# Patient Record
Sex: FEMALE | Race: OTHER | HISPANIC OR LATINO | URBAN - METROPOLITAN AREA
[De-identification: names, ages, dates, MRNs, and addresses within clinical notes are randomized per-mention and may not be internally consistent; named-entity substitution may affect disease eponyms.]

---

## 2018-10-14 ENCOUNTER — EMERGENCY (EMERGENCY)
Facility: HOSPITAL | Age: 28
LOS: 0 days | Discharge: HOME | End: 2018-10-14
Admitting: PHYSICIAN ASSISTANT

## 2018-10-14 VITALS
RESPIRATION RATE: 18 BRPM | DIASTOLIC BLOOD PRESSURE: 67 MMHG | OXYGEN SATURATION: 98 % | HEART RATE: 90 BPM | TEMPERATURE: 101 F | SYSTOLIC BLOOD PRESSURE: 127 MMHG

## 2018-10-14 DIAGNOSIS — R59.0 LOCALIZED ENLARGED LYMPH NODES: ICD-10-CM

## 2018-10-14 DIAGNOSIS — J02.9 ACUTE PHARYNGITIS, UNSPECIFIED: ICD-10-CM

## 2018-10-14 DIAGNOSIS — F17.200 NICOTINE DEPENDENCE, UNSPECIFIED, UNCOMPLICATED: ICD-10-CM

## 2018-10-14 DIAGNOSIS — Z79.2 LONG TERM (CURRENT) USE OF ANTIBIOTICS: ICD-10-CM

## 2018-10-14 RX ORDER — AMOXICILLIN 250 MG/5ML
1 SUSPENSION, RECONSTITUTED, ORAL (ML) ORAL
Qty: 20 | Refills: 0 | OUTPATIENT
Start: 2018-10-14 | End: 2018-10-23

## 2018-10-14 RX ORDER — ACETAMINOPHEN 500 MG
650 TABLET ORAL ONCE
Qty: 0 | Refills: 0 | Status: COMPLETED | OUTPATIENT
Start: 2018-10-14 | End: 2018-10-14

## 2018-10-14 RX ADMIN — Medication 650 MILLIGRAM(S): at 19:31

## 2018-10-14 NOTE — ED PROVIDER NOTE - PHYSICAL EXAMINATION
VITAL SIGNS: I have reviewed nursing notes and confirm.  CONSTITUTIONAL: Well-developed; well-nourished; in no acute distress.  SKIN: Skin exam is warm and dry, no acute rash.  HEAD: Normocephalic; atraumatic.  EYES: Conjunctiva and sclera clear.  ENT: No nasal discharge; airway clear. TMs clear. (+) tonsillar exudates/erythema. Uvula midline.   NECK: Supple; non tender. (+) anterior cervical LAD.   CARD: S1, S2 normal; no murmurs, gallops, or rubs. Regular rate and rhythm.  RESP: No wheezes, rales or rhonchi. Speaking in full sentences.   EXT: Normal ROM.   NEURO: Alert, oriented. Grossly unremarkable. No focal deficits.

## 2018-10-14 NOTE — ED PROVIDER NOTE - MEDICAL DECISION MAKING DETAILS
26 yo F presents to ED for sore throat x 2 days. Centor 4 points. Will prescribe amoxicillin and give ENT follow-up.

## 2022-01-11 NOTE — ED PROVIDER NOTE - OBJECTIVE STATEMENT
26 yo F with no significant PMHx presents to the ED c/o sore throat x 2 days. Pt states she gets strep infections almost every year during change of season. Pt admits to associated chills. Pt denies other complaints. Pt denies fever, chills, nausea, vomiting, abdominal pain, diarrhea, headache, dizziness, weakness, chest pain, SOB, back pain, urinary symptoms, cough. 67

## 2023-03-28 PROBLEM — Z00.00 ENCOUNTER FOR PREVENTIVE HEALTH EXAMINATION: Status: ACTIVE | Noted: 2023-03-28

## 2023-10-14 ENCOUNTER — EMERGENCY (EMERGENCY)
Facility: HOSPITAL | Age: 33
LOS: 0 days | Discharge: HOME | End: 2023-10-14
Attending: EMERGENCY MEDICINE
Payer: MEDICAID

## 2023-10-14 VITALS
TEMPERATURE: 98 F | WEIGHT: 179.9 LBS | HEART RATE: 82 BPM | SYSTOLIC BLOOD PRESSURE: 119 MMHG | DIASTOLIC BLOOD PRESSURE: 62 MMHG | RESPIRATION RATE: 16 BRPM | OXYGEN SATURATION: 100 %

## 2023-10-14 DIAGNOSIS — Z3A.01 LESS THAN 8 WEEKS GESTATION OF PREGNANCY: ICD-10-CM

## 2023-10-14 DIAGNOSIS — O20.9 HEMORRHAGE IN EARLY PREGNANCY, UNSPECIFIED: ICD-10-CM

## 2023-10-14 LAB
ALBUMIN SERPL ELPH-MCNC: 4.6 G/DL — SIGNIFICANT CHANGE UP (ref 3.5–5.2)
ALP SERPL-CCNC: 59 U/L — SIGNIFICANT CHANGE UP (ref 30–115)
ALT FLD-CCNC: 6 U/L — SIGNIFICANT CHANGE UP (ref 0–41)
ANION GAP SERPL CALC-SCNC: 9 MMOL/L — SIGNIFICANT CHANGE UP (ref 7–14)
APPEARANCE UR: ABNORMAL
APTT BLD: 34.4 SEC — SIGNIFICANT CHANGE UP (ref 27–39.2)
AST SERPL-CCNC: 10 U/L — SIGNIFICANT CHANGE UP (ref 0–41)
BACTERIA # UR AUTO: ABNORMAL /HPF
BASOPHILS # BLD AUTO: 0.03 K/UL — SIGNIFICANT CHANGE UP (ref 0–0.2)
BASOPHILS NFR BLD AUTO: 0.6 % — SIGNIFICANT CHANGE UP (ref 0–1)
BILIRUB SERPL-MCNC: 0.2 MG/DL — SIGNIFICANT CHANGE UP (ref 0.2–1.2)
BILIRUB UR-MCNC: ABNORMAL
BLD GP AB SCN SERPL QL: SIGNIFICANT CHANGE UP
BUN SERPL-MCNC: 11 MG/DL — SIGNIFICANT CHANGE UP (ref 10–20)
CALCIUM SERPL-MCNC: 9.1 MG/DL — SIGNIFICANT CHANGE UP (ref 8.4–10.5)
CHLORIDE SERPL-SCNC: 102 MMOL/L — SIGNIFICANT CHANGE UP (ref 98–110)
CO2 SERPL-SCNC: 26 MMOL/L — SIGNIFICANT CHANGE UP (ref 17–32)
COLOR SPEC: ABNORMAL
CREAT SERPL-MCNC: 0.7 MG/DL — SIGNIFICANT CHANGE UP (ref 0.7–1.5)
DIFF PNL FLD: ABNORMAL
EGFR: 117 ML/MIN/1.73M2 — SIGNIFICANT CHANGE UP
EOSINOPHIL # BLD AUTO: 0.11 K/UL — SIGNIFICANT CHANGE UP (ref 0–0.7)
EOSINOPHIL NFR BLD AUTO: 2.3 % — SIGNIFICANT CHANGE UP (ref 0–8)
EPI CELLS # UR: PRESENT
GLUCOSE SERPL-MCNC: 80 MG/DL — SIGNIFICANT CHANGE UP (ref 70–99)
GLUCOSE UR QL: NEGATIVE MG/DL — SIGNIFICANT CHANGE UP
HCG SERPL-ACNC: 4.1 MIU/ML — HIGH
HCT VFR BLD CALC: 37.7 % — SIGNIFICANT CHANGE UP (ref 37–47)
HGB BLD-MCNC: 12.2 G/DL — SIGNIFICANT CHANGE UP (ref 12–16)
IMM GRANULOCYTES NFR BLD AUTO: 0.2 % — SIGNIFICANT CHANGE UP (ref 0.1–0.3)
INR BLD: 1.23 RATIO — SIGNIFICANT CHANGE UP (ref 0.65–1.3)
KETONES UR-MCNC: NEGATIVE MG/DL — SIGNIFICANT CHANGE UP
LEUKOCYTE ESTERASE UR-ACNC: ABNORMAL
LYMPHOCYTES # BLD AUTO: 1.2 K/UL — SIGNIFICANT CHANGE UP (ref 1.2–3.4)
LYMPHOCYTES # BLD AUTO: 25.2 % — SIGNIFICANT CHANGE UP (ref 20.5–51.1)
MCHC RBC-ENTMCNC: 29.9 PG — SIGNIFICANT CHANGE UP (ref 27–31)
MCHC RBC-ENTMCNC: 32.4 G/DL — SIGNIFICANT CHANGE UP (ref 32–37)
MCV RBC AUTO: 92.4 FL — SIGNIFICANT CHANGE UP (ref 81–99)
MONOCYTES # BLD AUTO: 0.41 K/UL — SIGNIFICANT CHANGE UP (ref 0.1–0.6)
MONOCYTES NFR BLD AUTO: 8.6 % — SIGNIFICANT CHANGE UP (ref 1.7–9.3)
NEUTROPHILS # BLD AUTO: 3.01 K/UL — SIGNIFICANT CHANGE UP (ref 1.4–6.5)
NEUTROPHILS NFR BLD AUTO: 63.1 % — SIGNIFICANT CHANGE UP (ref 42.2–75.2)
NITRITE UR-MCNC: NEGATIVE — SIGNIFICANT CHANGE UP
NRBC # BLD: 0 /100 WBCS — SIGNIFICANT CHANGE UP (ref 0–0)
PH UR: 6.5 — SIGNIFICANT CHANGE UP (ref 5–8)
PLATELET # BLD AUTO: 332 K/UL — SIGNIFICANT CHANGE UP (ref 130–400)
PMV BLD: 10.2 FL — SIGNIFICANT CHANGE UP (ref 7.4–10.4)
POTASSIUM SERPL-MCNC: 4.3 MMOL/L — SIGNIFICANT CHANGE UP (ref 3.5–5)
POTASSIUM SERPL-SCNC: 4.3 MMOL/L — SIGNIFICANT CHANGE UP (ref 3.5–5)
PROT SERPL-MCNC: 7.4 G/DL — SIGNIFICANT CHANGE UP (ref 6–8)
PROT UR-MCNC: 100 MG/DL
PROTHROM AB SERPL-ACNC: 14.1 SEC — HIGH (ref 9.95–12.87)
RBC # BLD: 4.08 M/UL — LOW (ref 4.2–5.4)
RBC # FLD: 14.2 % — SIGNIFICANT CHANGE UP (ref 11.5–14.5)
RBC CASTS # UR COMP ASSIST: 200 /HPF — HIGH (ref 0–4)
SODIUM SERPL-SCNC: 137 MMOL/L — SIGNIFICANT CHANGE UP (ref 135–146)
SP GR SPEC: >1.03 — HIGH (ref 1–1.03)
UROBILINOGEN FLD QL: 0.2 MG/DL — SIGNIFICANT CHANGE UP (ref 0.2–1)
WBC # BLD: 4.77 K/UL — LOW (ref 4.8–10.8)
WBC # FLD AUTO: 4.77 K/UL — LOW (ref 4.8–10.8)
WBC UR QL: 6 /HPF — HIGH (ref 0–5)

## 2023-10-14 PROCEDURE — 85730 THROMBOPLASTIN TIME PARTIAL: CPT

## 2023-10-14 PROCEDURE — 86900 BLOOD TYPING SEROLOGIC ABO: CPT

## 2023-10-14 PROCEDURE — 85025 COMPLETE CBC W/AUTO DIFF WBC: CPT

## 2023-10-14 PROCEDURE — 86901 BLOOD TYPING SEROLOGIC RH(D): CPT

## 2023-10-14 PROCEDURE — 80053 COMPREHEN METABOLIC PANEL: CPT

## 2023-10-14 PROCEDURE — 36415 COLL VENOUS BLD VENIPUNCTURE: CPT

## 2023-10-14 PROCEDURE — 76830 TRANSVAGINAL US NON-OB: CPT

## 2023-10-14 PROCEDURE — 81001 URINALYSIS AUTO W/SCOPE: CPT

## 2023-10-14 PROCEDURE — 99284 EMERGENCY DEPT VISIT MOD MDM: CPT

## 2023-10-14 PROCEDURE — 86850 RBC ANTIBODY SCREEN: CPT

## 2023-10-14 PROCEDURE — 84702 CHORIONIC GONADOTROPIN TEST: CPT

## 2023-10-14 PROCEDURE — 87086 URINE CULTURE/COLONY COUNT: CPT

## 2023-10-14 PROCEDURE — 85610 PROTHROMBIN TIME: CPT

## 2023-10-14 PROCEDURE — 76830 TRANSVAGINAL US NON-OB: CPT | Mod: 26

## 2023-10-14 PROCEDURE — 99284 EMERGENCY DEPT VISIT MOD MDM: CPT | Mod: 25

## 2023-10-14 NOTE — ED PROVIDER NOTE - PROGRESS NOTE DETAILS
results discussed. patient is asking for ob to follow up with.  patient does not feel comfortable having pelvic examination . patient states is having no pain. patient states she will follow up with her ob. patient understands that this is part of exam. patient states she does not feel comfortable bc she has period. patient will follow up and return to ed for any new or worsening symptoms.

## 2023-10-14 NOTE — ED PROVIDER NOTE - PATIENT PORTAL LINK FT
You can access the FollowMyHealth Patient Portal offered by [facility] by registering at the following website: [email or web address]. By joining [facility]’s FollowMyHealth portal, you will also be able to view your health information using other applications (apps) compatible with our system.

## 2023-10-14 NOTE — ED ADULT NURSE NOTE - NSFALLUNIVINTERV_ED_ALL_ED
Bed/Stretcher in lowest position, wheels locked, appropriate side rails in place/Call bell, personal items and telephone in reach/Instruct patient to call for assistance before getting out of bed/chair/stretcher/Non-slip footwear applied when patient is off stretcher/[place] to call system/Physically safe environment - no spills, clutter or unnecessary equipment/Purposeful proactive rounding/Room/bathroom lighting operational, light cord in reach

## 2023-10-14 NOTE — ED PROVIDER NOTE - ATTENDING APP SHARED VISIT CONTRIBUTION OF CARE
32-year-old female LMP 9/15/2023 presents for evaluation of vaginal bleeding that started today with passage of clots.  Patient states she took a pregnancy test which was positive.  Patient without pain.  No dysuria, on exam patient is in NAD, AO x2, abdomen soft nontender nondistended

## 2023-10-14 NOTE — ED PROVIDER NOTE - NSFOLLOWUPINSTRUCTIONS_ED_ALL_ED_FT
follow up of ob/gyn     return to ed for any new or worsening symptoms    Our Emergency Department Referral Coordinators will be reaching out to you in the next 24-48 hours from 9:00am to 5:00pm with a follow up appointment. Please expect a phone call from the hospital in that time frame. If you do not receive a call or if you have any questions or concerns, you can reach them at [phone] or [phone].

## 2023-10-14 NOTE — ED PROVIDER NOTE - OBJECTIVE STATEMENT
this is a 33 yo female presents to ed for evaluation. patient last Menstrual period was sept 15. patient states she did not get her period on october 11. patient states on oct 12 she took a pregnancy test and it was positive. patient states today she started to bleed. patient denies pain

## 2023-10-14 NOTE — ED PROVIDER NOTE - CLINICAL SUMMARY MEDICAL DECISION MAKING FREE TEXT BOX
Labs and ultrasound obtained.  Beta hCG is 4.1.  No intrauterine pregnancy is seen on sonogram.  Discussed results with patient.  Patient will need to follow-up with OB/GYN for further lab testing and evaluation.  Patient is return if any worsening symptoms or concerns.  Patient verbalized understanding.

## 2023-10-14 NOTE — ED ADULT TRIAGE NOTE - CHIEF COMPLAINT QUOTE
pt stated she tested positive for pregnancy 2 days ago, yesterday she began passing clots and bleeding  pt complaining of abdominal cramping   pt unsure of how many pads she has gone through  pts LMP 9/15/2023

## 2023-10-15 LAB
CULTURE RESULTS: SIGNIFICANT CHANGE UP
SPECIMEN SOURCE: SIGNIFICANT CHANGE UP

## 2023-11-13 ENCOUNTER — APPOINTMENT (OUTPATIENT)
Dept: OBGYN | Facility: CLINIC | Age: 33
End: 2023-11-13

## 2024-07-26 ENCOUNTER — APPOINTMENT (OUTPATIENT)
Dept: PLASTIC SURGERY | Facility: CLINIC | Age: 34
End: 2024-07-26
Payer: MEDICAID

## 2024-07-26 VITALS — WEIGHT: 141 LBS | BODY MASS INDEX: 24.07 KG/M2 | HEIGHT: 64 IN

## 2024-07-26 DIAGNOSIS — Z78.9 OTHER SPECIFIED HEALTH STATUS: ICD-10-CM

## 2024-07-26 PROCEDURE — 99203 OFFICE O/P NEW LOW 30 MIN: CPT

## 2024-07-26 RX ORDER — PHENDIMETRAZINE TARTRATE 35 MG/1
TABLET ORAL
Refills: 0 | Status: ACTIVE | COMMUNITY

## 2024-07-26 NOTE — PHYSICAL EXAM
[NI] : Normal [de-identified] : Excess skin of abdomen with primarily vertical skin laxity, minimal residual adiposity, supra-umbilical skin striae. No hernia palpable. Well healed C section scar. Rectus diastasis 1-2 cm throughout without bulge on Valsalva or diver pose. No active rashes. Minimal overlying pannus. grade 1 [de-identified] : Bilateral arms with excess skin throughout, minimal excess adiposity. No batwing deformity. Stretch marks throughout. Circumference R: 32cm, on compression 26cm. L: 31.5cm, on compression, 27.5. Excess skins of bilateral medial thighs.

## 2024-07-26 NOTE — HISTORY OF PRESENT ILLNESS
[FreeTextEntry1] : 33F no PMHx presents after massive weight loss (120 lbs) with exercise and Ozempic. Max weight 260 lbs, current weight 141 lbs, she has been weight stable for 2 months. No longer taking ozempic. Currently taking phendimetrazine appetite suppressant. C section x2 in the past. No plans for further pregnancies. No allergies.  She complains of loose skin of the arms, thighs, and abdomen with associated chafing x 1 year.  The excess hanging skin of medial arms is impacting her psychosocial interactions.  In order of preference, she would like to address: medial arms > abdomen > medial thighs  SocHx: Occasionally vaporizes nicotine 3-4 times a day.

## 2024-07-26 NOTE — ASSESSMENT
[FreeTextEntry1] : 33F s/p 120lb weight loss with Ozempic and exercise.  Now with symptomatic rashes secondary to excess medial arm and abdominal pannus.  The patient is a good candidate for traditional brachioplasty and concurrent panniculectomy.   She would benefit from medical necessity standpoint for excess skin removal.  -I had detailed discussion regarding her treatment options: no removal of excess skin vs brachioplasty - I explained the benefits, risks and outcomes of each treatment options. -Regarding extended brachioplasty, I demonstrated and marked the proposed scar location along medial arm extending into the axilla and the proposed effect of excisional brachioplasty in the mirror with the patient. I emphasized that the scar is prominent and may be visible after surgery. I explained the risks that include but are not limited to bleeding, infection, hematoma, seroma, poor wound healing, wound separation, fat and/or tissue necrosis, hypertrophic scar/keloid formation, temporary possible permanent number of medial upper arm, dissatisfaction with the outcome, risk of VTE and possible pulmonary embolism, and need for re-admission.  -She understood all the risks and would like to proceed with surgery. Informed consent was obtained. -I recommended that the the abdominal excess skin be addressed during a separate body contouring procedure. -Pt directed to obtain arm sleeve compression garment to be purchased by patient. Application 1 week after surgery during post-op visit. -Regarding the panniculectomy, the discussed the benefits, risks, and outcomes. I explained the risk of bleeding, infection, hematoma, seroma, poor wound healing, wound separation, fat and/or tissue necrosis, hypertrophic scar/keloid formation, umbilicus ischemia, risk of VTE and possible pulmonary embolism, and need for re-admission, and dissatisfaction with the outcome. I also discussed the anticipated scar locations (hip-to-hip; periumbilical), use of surgical drains, and need for post-operative use of an abdominal binder and lifting restrictions after surgery. - She understands the supra-umbilical striae is not addressed with abdominoplasty. -The patient understand the risks and post-operative expectations. -All the patient's questions were answered to her apparent satisfaction. Informed consent was obtained. -Does not need VNS for drain management. -Will schedule for outpatient TALAT procedure pending further weight stability x 3 months -follow up in 3 months for re-evaluation  Photos were taken with patient permission.

## 2024-10-18 ENCOUNTER — APPOINTMENT (OUTPATIENT)
Dept: PLASTIC SURGERY | Facility: CLINIC | Age: 34
End: 2024-10-18

## 2025-01-27 ENCOUNTER — NON-APPOINTMENT (OUTPATIENT)
Age: 35
End: 2025-01-27

## 2025-01-28 ENCOUNTER — APPOINTMENT (OUTPATIENT)
Dept: PLASTIC SURGERY | Facility: AMBULATORY SURGERY CENTER | Age: 35
End: 2025-01-28

## 2025-02-04 ENCOUNTER — APPOINTMENT (OUTPATIENT)
Dept: PLASTIC SURGERY | Facility: CLINIC | Age: 35
End: 2025-02-04